# Patient Record
Sex: FEMALE | Race: BLACK OR AFRICAN AMERICAN | NOT HISPANIC OR LATINO | Employment: UNEMPLOYED | URBAN - METROPOLITAN AREA
[De-identification: names, ages, dates, MRNs, and addresses within clinical notes are randomized per-mention and may not be internally consistent; named-entity substitution may affect disease eponyms.]

---

## 2018-02-21 ENCOUNTER — SOCIAL WORK (OUTPATIENT)
Dept: TRANSPLANT | Facility: HOSPITAL | Age: 38
End: 2018-02-21

## 2018-02-21 NOTE — PROGRESS NOTES
"Pt's mother Jeanne Betancourt ph [phone] phoned to give update on pt. Mother reports pt is doing well, heart is at 37% capacity, has had 3 occlusions in L leg opened and goes to gym daily. Mom reports pt's R big toe fell off "with no blood" and tip of next R toe fell off. Pt is under care of Dr. Alonso and Dr. Lilly.  Mom reports pt is on Disability and has a Medicaid waiver. Mom reports pt is coping well, has gone to a couple movies. Mother reports she and pt appreciate all care provided by entire team and requests SW share information with Dr. Arevalo. SW remains available.  "